# Patient Record
Sex: FEMALE | NOT HISPANIC OR LATINO
[De-identification: names, ages, dates, MRNs, and addresses within clinical notes are randomized per-mention and may not be internally consistent; named-entity substitution may affect disease eponyms.]

---

## 2017-02-27 ENCOUNTER — APPOINTMENT (OUTPATIENT)
Dept: ULTRASOUND IMAGING | Facility: CLINIC | Age: 82
End: 2017-02-27

## 2017-02-27 ENCOUNTER — APPOINTMENT (OUTPATIENT)
Dept: MAMMOGRAPHY | Facility: CLINIC | Age: 82
End: 2017-02-27

## 2017-06-20 ENCOUNTER — EMERGENCY (EMERGENCY)
Facility: HOSPITAL | Age: 82
LOS: 1 days | Discharge: HOME | End: 2017-06-20
Attending: EMERGENCY MEDICINE | Admitting: EMERGENCY MEDICINE
Payer: MEDICARE

## 2017-06-20 VITALS
DIASTOLIC BLOOD PRESSURE: 85 MMHG | RESPIRATION RATE: 18 BRPM | WEIGHT: 108.03 LBS | SYSTOLIC BLOOD PRESSURE: 156 MMHG | OXYGEN SATURATION: 99 % | HEIGHT: 61 IN | TEMPERATURE: 98 F | HEART RATE: 84 BPM

## 2017-06-20 DIAGNOSIS — I10 ESSENTIAL (PRIMARY) HYPERTENSION: ICD-10-CM

## 2017-06-20 DIAGNOSIS — Z88.0 ALLERGY STATUS TO PENICILLIN: ICD-10-CM

## 2017-06-20 DIAGNOSIS — E03.9 HYPOTHYROIDISM, UNSPECIFIED: ICD-10-CM

## 2017-06-20 DIAGNOSIS — Z86.73 PERSONAL HISTORY OF TRANSIENT ISCHEMIC ATTACK (TIA), AND CEREBRAL INFARCTION WITHOUT RESIDUAL DEFICITS: ICD-10-CM

## 2017-06-20 DIAGNOSIS — R60.0 LOCALIZED EDEMA: ICD-10-CM

## 2017-06-20 DIAGNOSIS — M19.90 UNSPECIFIED OSTEOARTHRITIS, UNSPECIFIED SITE: ICD-10-CM

## 2017-06-20 PROCEDURE — 93971 EXTREMITY STUDY: CPT | Mod: 26,LT

## 2017-06-20 PROCEDURE — 99284 EMERGENCY DEPT VISIT MOD MDM: CPT | Mod: 25

## 2017-06-20 PROCEDURE — 93971 EXTREMITY STUDY: CPT

## 2017-06-20 PROCEDURE — 99284 EMERGENCY DEPT VISIT MOD MDM: CPT

## 2017-06-20 RX ORDER — METOPROLOL TARTRATE 50 MG
0 TABLET ORAL
Qty: 0 | Refills: 0 | COMMUNITY

## 2017-06-20 RX ORDER — AMLODIPINE BESYLATE 2.5 MG/1
1 TABLET ORAL
Qty: 0 | Refills: 0 | COMMUNITY

## 2017-06-20 RX ORDER — LEVOTHYROXINE SODIUM 125 MCG
0 TABLET ORAL
Qty: 0 | Refills: 0 | COMMUNITY

## 2017-06-20 NOTE — ED ADULT NURSE NOTE - OBJECTIVE STATEMENT
Pt is A&OX4. Pt was received C/O left ankle swelling since today and left calf pain at times. However, pt presently denies pain and discomfort. Pt was evaluated by Dr. Mace who is aware. No s/s of distress noted. Continue to monitor pt.

## 2017-06-20 NOTE — ED ADULT TRIAGE NOTE - CHIEF COMPLAINT QUOTE
"my right leg has been swollen all day today."  patient denies injury to affected extremity.  patient denies shortness of breath, chest pain, dizziness.   steady gait noted

## 2017-06-21 NOTE — ED PROVIDER NOTE - OBJECTIVE STATEMENT
Pt is a 82 yo female who presents to the ED with a cc of left lower ext swelling.  Pt reports that she first noted the swelling this afternoon.  Denies pain in the leg, denies numbness or tingling in ext.  Pt denies trauma to the leg.   She has not been on any recent long car rides or trips.  Pt has no known history of DVTs or PEs.  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  Denies CP, SOB, abd pain.  Daughter was concerned that this may be a blood clot.

## 2017-06-21 NOTE — ED PROVIDER NOTE - PLAN OF CARE
Return to the ED for any new or worsening symptoms  Take your medication as prescribed  Elevate your legs when seated  Consider compression stockings  Follow up with your PMD in 2-3 days for a recheck

## 2017-06-21 NOTE — ED PROVIDER NOTE - MUSCULOSKELETAL, MLM
+2 pitting edema noted to left lower ext, no overlying cellulitis, no TTP, sensation intact +pedal pulse

## 2023-02-02 DIAGNOSIS — I10 ESSENTIAL (PRIMARY) HYPERTENSION: ICD-10-CM

## 2023-02-02 DIAGNOSIS — H54.8 LEGAL BLINDNESS, AS DEFINED IN USA: ICD-10-CM

## 2023-02-02 DIAGNOSIS — Z91.81 HISTORY OF FALLING: ICD-10-CM

## 2023-02-02 DIAGNOSIS — H40.9 UNSPECIFIED GLAUCOMA: ICD-10-CM

## 2023-02-02 DIAGNOSIS — R94.6 ABNORMAL RESULTS OF THYROID FUNCTION STUDIES: ICD-10-CM

## 2023-02-02 DIAGNOSIS — E03.9 HYPOTHYROIDISM, UNSPECIFIED: ICD-10-CM

## 2023-02-02 DIAGNOSIS — Z92.89 PERSONAL HISTORY OF OTHER MEDICAL TREATMENT: ICD-10-CM

## 2023-02-02 DIAGNOSIS — Z86.69 PERSONAL HISTORY OF OTHER DISEASES OF THE NERVOUS SYSTEM AND SENSE ORGANS: ICD-10-CM

## 2023-02-02 DIAGNOSIS — L85.3 XEROSIS CUTIS: ICD-10-CM

## 2023-02-07 PROBLEM — E03.9 HYPOTHYROIDISM, UNSPECIFIED: Chronic | Status: ACTIVE | Noted: 2017-06-20

## 2023-02-07 PROBLEM — I10 ESSENTIAL (PRIMARY) HYPERTENSION: Chronic | Status: ACTIVE | Noted: 2017-06-20

## 2023-02-07 PROBLEM — I63.9 CEREBRAL INFARCTION, UNSPECIFIED: Chronic | Status: ACTIVE | Noted: 2017-06-20

## 2023-02-07 PROBLEM — H40.9 UNSPECIFIED GLAUCOMA: Chronic | Status: ACTIVE | Noted: 2017-06-20

## 2023-02-21 ENCOUNTER — OUTPATIENT (OUTPATIENT)
Dept: OUTPATIENT SERVICES | Facility: HOSPITAL | Age: 88
LOS: 1 days | Discharge: HOME | End: 2023-02-21

## 2023-02-21 DIAGNOSIS — R79.9 ABNORMAL FINDING OF BLOOD CHEMISTRY, UNSPECIFIED: ICD-10-CM

## 2023-02-22 ENCOUNTER — RESULT REVIEW (OUTPATIENT)
Age: 88
End: 2023-02-22

## 2023-02-22 ENCOUNTER — APPOINTMENT (OUTPATIENT)
Dept: HEMATOLOGY ONCOLOGY | Facility: CLINIC | Age: 88
End: 2023-02-22
Payer: MEDICARE

## 2023-02-22 VITALS
DIASTOLIC BLOOD PRESSURE: 76 MMHG | BODY MASS INDEX: 16.64 KG/M2 | RESPIRATION RATE: 17 BRPM | HEART RATE: 83 BPM | OXYGEN SATURATION: 96 % | TEMPERATURE: 98 F | WEIGHT: 91 LBS | SYSTOLIC BLOOD PRESSURE: 129 MMHG

## 2023-02-22 DIAGNOSIS — D75.839 THROMBOCYTOSIS, UNSPECIFIED: ICD-10-CM

## 2023-02-22 DIAGNOSIS — N18.31 CHRONIC KIDNEY DISEASE, STAGE 3A: ICD-10-CM

## 2023-02-22 DIAGNOSIS — D64.9 ANEMIA, UNSPECIFIED: ICD-10-CM

## 2023-02-22 DIAGNOSIS — D50.9 IRON DEFICIENCY ANEMIA, UNSPECIFIED: ICD-10-CM

## 2023-02-22 LAB
BASOPHILS # BLD AUTO: 0.05 K/UL — SIGNIFICANT CHANGE UP (ref 0–0.2)
BASOPHILS NFR BLD AUTO: 0.5 % — SIGNIFICANT CHANGE UP (ref 0–2)
EOSINOPHIL # BLD AUTO: 0.16 K/UL — SIGNIFICANT CHANGE UP (ref 0–0.5)
EOSINOPHIL NFR BLD AUTO: 1.6 % — SIGNIFICANT CHANGE UP (ref 0–6)
ERYTHROCYTE [SEDIMENTATION RATE] IN BLOOD: 85 MM/HR — HIGH (ref 0–20)
FERRITIN SERPL-MCNC: 28 NG/ML — SIGNIFICANT CHANGE UP (ref 15–150)
FOLATE SERPL-MCNC: >20 NG/ML — SIGNIFICANT CHANGE UP
HCT VFR BLD CALC: 36.6 % — SIGNIFICANT CHANGE UP (ref 34.5–45)
HGB BLD-MCNC: 11.7 G/DL — SIGNIFICANT CHANGE UP (ref 11.5–15.5)
IMM GRANULOCYTES NFR BLD AUTO: 0.3 % — SIGNIFICANT CHANGE UP (ref 0–0.9)
IRON SATN MFR SERPL: 12 % — LOW (ref 14–50)
IRON SATN MFR SERPL: 42 UG/DL — SIGNIFICANT CHANGE UP (ref 30–160)
LYMPHOCYTES # BLD AUTO: 1.6 K/UL — SIGNIFICANT CHANGE UP (ref 1–3.3)
LYMPHOCYTES # BLD AUTO: 16.2 % — SIGNIFICANT CHANGE UP (ref 13–44)
MCHC RBC-ENTMCNC: 29 PG — SIGNIFICANT CHANGE UP (ref 27–34)
MCHC RBC-ENTMCNC: 32 GM/DL — SIGNIFICANT CHANGE UP (ref 32–36)
MCV RBC AUTO: 90.8 FL — SIGNIFICANT CHANGE UP (ref 80–100)
MONOCYTES # BLD AUTO: 1.1 K/UL — HIGH (ref 0–0.9)
MONOCYTES NFR BLD AUTO: 11.1 % — SIGNIFICANT CHANGE UP (ref 2–14)
NEUTROPHILS # BLD AUTO: 6.95 K/UL — SIGNIFICANT CHANGE UP (ref 1.8–7.4)
NEUTROPHILS NFR BLD AUTO: 70.3 % — SIGNIFICANT CHANGE UP (ref 43–77)
NRBC # BLD: 0 /100 WBCS — SIGNIFICANT CHANGE UP (ref 0–0)
PLATELET # BLD AUTO: 502 K/UL — HIGH (ref 150–400)
RBC # BLD: 4.03 M/UL — SIGNIFICANT CHANGE UP (ref 3.8–5.2)
RBC # FLD: 14.6 % — HIGH (ref 10.3–14.5)
TIBC SERPL-MCNC: 359 UG/DL — SIGNIFICANT CHANGE UP (ref 220–430)
UIBC SERPL-MCNC: 318 UG/DL — SIGNIFICANT CHANGE UP (ref 110–370)
VIT B12 SERPL-MCNC: 1145 PG/ML — SIGNIFICANT CHANGE UP (ref 232–1245)
WBC # BLD: 9.89 K/UL — SIGNIFICANT CHANGE UP (ref 3.8–10.5)
WBC # FLD AUTO: 9.89 K/UL — SIGNIFICANT CHANGE UP (ref 3.8–10.5)

## 2023-02-22 PROCEDURE — 99204 OFFICE O/P NEW MOD 45 MIN: CPT

## 2023-02-22 RX ORDER — MULTIVITAMIN
CAPSULE ORAL
Refills: 0 | Status: ACTIVE | COMMUNITY

## 2023-02-22 RX ORDER — SELENIUM 50 MCG
TABLET ORAL
Refills: 0 | Status: ACTIVE | COMMUNITY

## 2023-02-22 RX ORDER — BERBERINE CHLOR/SEAWEED/CHROM 500-250 MG
CAPSULE ORAL
Refills: 0 | Status: ACTIVE | COMMUNITY

## 2023-02-22 RX ORDER — VITAMIN B COMPLEX
CAPSULE ORAL
Refills: 0 | Status: ACTIVE | COMMUNITY

## 2023-02-22 RX ORDER — AMLODIPINE BESYLATE 10 MG/1
10 TABLET ORAL
Refills: 0 | Status: ACTIVE | COMMUNITY

## 2023-02-22 RX ORDER — UBIDECARENONE 200 MG
CAPSULE ORAL
Refills: 0 | Status: ACTIVE | COMMUNITY

## 2023-02-22 RX ORDER — MAGNESIUM OXIDE/MAG AA CHELATE 300 MG
CAPSULE ORAL
Refills: 0 | Status: ACTIVE | COMMUNITY

## 2023-02-22 RX ORDER — LUTEIN 6 MG
TABLET ORAL
Refills: 0 | Status: ACTIVE | COMMUNITY

## 2023-02-22 RX ORDER — BENZOCAINE/BENZALKONIUM/ALOE/E 5 %-0.13 %
CREAM (GRAM) TOPICAL
Refills: 0 | Status: ACTIVE | COMMUNITY

## 2023-02-22 NOTE — ASSESSMENT
[FreeTextEntry1] : Patient is an 87 y.o. with a chronic thrombocytosis and mild anemia - low iron studies. \par Reviewed with patient that elevated platelets can be from a primary or a secondary bone marrow issues. \par Doubt primary disorder since she has normal white and red cell counts, and since she has had for so long would suspect would have risen higher by now. \par More likely thrombocytosis is reactive.  She does have borderline low iron studies and more recently a mild anemia.  No obvious source for iron deficiency - but no baseline labs - may have been chronic. \par \par Plan:\par Propose replace iron stores 1st and see if this normalizes platelet count.  If not can then consider a more extensive evaluation. \par Explained she can take PO or IV iron. Advantages of IV iron - works fast but small risk of serious allergic reaction.  PO iron works slower but can cause GI distress. \par Patient initially preferred IV iron, but then was afraid of the risk for anaphylaxis. \par She opted to try PO iron 1st.  Explained works best if taken on an empty stomach with Vitamin C.  If cannot tolerate this way then OK to take with food.  Informed may turn stool black and can be constipating - may need to add stool softener. Can take any over the counter brand - recommended Hemaplex.\par For completeness sake will also check B12, Folate levels. \par Will repeat labs in 3 months to make sure absorbing.  Will have done thru home draw. \par All questions answered. \par \par Dr. Neetu Lau ([place])\par Mavis Molina (Daughter - [phone] - POA)\par

## 2023-02-22 NOTE — REASON FOR VISIT
[Initial Consultation] : an initial consultation for [Other: _____] : [unfilled] [FreeTextEntry2] : thrombocytosis

## 2023-02-22 NOTE — REVIEW OF SYSTEMS
[Patient Intake Form Reviewed] : Patient intake form was reviewed [Fatigue] : fatigue [Vision Problems] : vision problems [Muscle Weakness] : muscle weakness [Difficulty Walking] : difficulty walking [Negative] : Heme/Lymph [FreeTextEntry2] : Denies pains [FreeTextEntry3] : blind

## 2023-02-22 NOTE — RESULTS/DATA
[FreeTextEntry1] :  WBC: 9.89,   Hgb: 11.7,  Hct: 36.6,  MCV: 90.8,  Plts: 502\par Ferritin, TSAT, ESR, B12, Folate: pending

## 2023-02-22 NOTE — HISTORY OF PRESENT ILLNESS
[de-identified] : KAYLEY BEDOYA is an 87 y.o. 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 \par \par 5/2/22:       WBC: 9.7,   Hgb: 12.1,  Hct: 36.3,  MCV: 88,  Plts: 552\par 11/11/22 :  WBC: 10.6,  Hgb: 11.0,  Hct: 33.8,  MCV: 86,  Plts: 604,  Ferritin: 45,  TSAT: 12%\par \par Recalls had heavy periods when younger and anemia as well.  Has had 2 children. Never took PO iron. \par Has never had a blood transfusion. ?May have had IV iron after her last pregnancy?\par No personal or family hx of a bleeding or clotting disorder (although patient has never really had any surgery). \par Recalls platelets have always been high.  Daughter has been keeping track - highest perhaps was 655K. \par Patient states reluctant to take PO iron due to hx of hemorrhagic stroke?  Was afraid that somehow this was related. \par Overall she is reluctant to see doctors - feels they make her sick.  Has never had a GI eval. \par \par Denies pica. Patient in wheelchair.  No overt symptoms of anemia.  \par Denies any obvious bleeding - nose, gums, vagina, hematuria, BRBPR. No significant bruising. \par \par \par

## 2023-02-22 NOTE — PHYSICAL EXAM
[Thin] : thin [Normal] : affect appropriate [de-identified] : frail elderly woman in wheelchair [de-identified] : blind [de-identified] : no c/c/e [de-identified] : no rashes

## 2023-02-23 ENCOUNTER — NON-APPOINTMENT (OUTPATIENT)
Age: 88
End: 2023-02-23

## 2023-04-05 ENCOUNTER — NON-APPOINTMENT (OUTPATIENT)
Age: 88
End: 2023-04-05

## 2023-04-13 ENCOUNTER — APPOINTMENT (OUTPATIENT)
Dept: INFUSION THERAPY | Facility: CLINIC | Age: 88
End: 2023-04-13

## 2023-04-13 VITALS
HEART RATE: 92 BPM | SYSTOLIC BLOOD PRESSURE: 116 MMHG | WEIGHT: 99.06 LBS | OXYGEN SATURATION: 96 % | BODY MASS INDEX: 21.37 KG/M2 | TEMPERATURE: 98.7 F | DIASTOLIC BLOOD PRESSURE: 75 MMHG | RESPIRATION RATE: 18 BRPM | HEIGHT: 57.09 IN

## 2023-04-21 ENCOUNTER — APPOINTMENT (OUTPATIENT)
Dept: INFUSION THERAPY | Facility: CLINIC | Age: 88
End: 2023-04-21

## 2023-04-21 VITALS
SYSTOLIC BLOOD PRESSURE: 111 MMHG | DIASTOLIC BLOOD PRESSURE: 73 MMHG | WEIGHT: 100.25 LBS | RESPIRATION RATE: 18 BRPM | TEMPERATURE: 97.3 F | BODY MASS INDEX: 21.63 KG/M2 | OXYGEN SATURATION: 97 % | HEART RATE: 86 BPM | HEIGHT: 57 IN

## 2023-04-24 DIAGNOSIS — R11.2 NAUSEA WITH VOMITING, UNSPECIFIED: ICD-10-CM

## 2023-05-22 ENCOUNTER — LABORATORY RESULT (OUTPATIENT)
Age: 88
End: 2023-05-22

## 2023-05-23 ENCOUNTER — LABORATORY RESULT (OUTPATIENT)
Age: 88
End: 2023-05-23

## 2023-05-25 ENCOUNTER — LABORATORY RESULT (OUTPATIENT)
Age: 88
End: 2023-05-25

## 2023-05-26 ENCOUNTER — NON-APPOINTMENT (OUTPATIENT)
Age: 88
End: 2023-05-26

## 2023-07-15 DIAGNOSIS — R05.9 COUGH, UNSPECIFIED: ICD-10-CM

## 2023-07-15 DIAGNOSIS — R50.9 FEVER, UNSPECIFIED: ICD-10-CM

## 2023-07-15 RX ORDER — DOXYCYCLINE HYCLATE 100 MG/1
100 CAPSULE ORAL DAILY
Qty: 14 | Refills: 0 | Status: ACTIVE | COMMUNITY
Start: 2023-07-15 | End: 1900-01-01

## 2023-07-22 DIAGNOSIS — R05.3 CHRONIC COUGH: ICD-10-CM

## 2023-08-07 ENCOUNTER — LABORATORY RESULT (OUTPATIENT)
Age: 88
End: 2023-08-07

## 2023-09-18 ENCOUNTER — LABORATORY RESULT (OUTPATIENT)
Age: 88
End: 2023-09-18

## 2023-09-19 ENCOUNTER — NON-APPOINTMENT (OUTPATIENT)
Age: 88
End: 2023-09-19

## 2023-11-07 ENCOUNTER — OUTPATIENT (OUTPATIENT)
Dept: OUTPATIENT SERVICES | Facility: HOSPITAL | Age: 88
LOS: 1 days | Discharge: HOME | End: 2023-11-07

## 2023-11-07 DIAGNOSIS — D50.9 IRON DEFICIENCY ANEMIA, UNSPECIFIED: ICD-10-CM

## 2023-11-10 ENCOUNTER — APPOINTMENT (OUTPATIENT)
Dept: HEMATOLOGY ONCOLOGY | Facility: CLINIC | Age: 88
End: 2023-11-10

## 2023-11-20 ENCOUNTER — APPOINTMENT (OUTPATIENT)
Dept: HEMATOLOGY ONCOLOGY | Facility: CLINIC | Age: 88
End: 2023-11-20

## 2024-01-15 LAB
CHOLEST SERPL-MCNC: 170 MG/DL
ESTIMATED AVERAGE GLUCOSE: 120 MG/DL
HBA1C MFR BLD HPLC: 5.8 %
HDLC SERPL-MCNC: 57 MG/DL
LDLC SERPL CALC-MCNC: 100 MG/DL
NONHDLC SERPL-MCNC: 113 MG/DL
TRIGL SERPL-MCNC: 68 MG/DL

## 2024-01-23 ENCOUNTER — EMERGENCY (EMERGENCY)
Facility: HOSPITAL | Age: 89
LOS: 1 days | Discharge: HOME | End: 2024-01-23
Payer: MEDICARE

## 2024-01-23 VITALS
TEMPERATURE: 98 F | SYSTOLIC BLOOD PRESSURE: 119 MMHG | HEART RATE: 85 BPM | DIASTOLIC BLOOD PRESSURE: 67 MMHG | OXYGEN SATURATION: 97 % | RESPIRATION RATE: 18 BRPM

## 2024-01-23 VITALS
OXYGEN SATURATION: 98 % | TEMPERATURE: 98 F | WEIGHT: 97 LBS | SYSTOLIC BLOOD PRESSURE: 128 MMHG | DIASTOLIC BLOOD PRESSURE: 67 MMHG | HEIGHT: 61 IN | HEART RATE: 80 BPM | RESPIRATION RATE: 19 BRPM

## 2024-01-23 PROCEDURE — 73030 X-RAY EXAM OF SHOULDER: CPT | Mod: 26,LT

## 2024-01-23 PROCEDURE — 73090 X-RAY EXAM OF FOREARM: CPT | Mod: 26,LT

## 2024-01-23 PROCEDURE — 73110 X-RAY EXAM OF WRIST: CPT

## 2024-01-23 PROCEDURE — 73060 X-RAY EXAM OF HUMERUS: CPT | Mod: 26,LT

## 2024-01-23 PROCEDURE — 99284 EMERGENCY DEPT VISIT MOD MDM: CPT

## 2024-01-23 PROCEDURE — 73090 X-RAY EXAM OF FOREARM: CPT

## 2024-01-23 PROCEDURE — 73030 X-RAY EXAM OF SHOULDER: CPT

## 2024-01-23 PROCEDURE — 72125 CT NECK SPINE W/O DYE: CPT | Mod: MA

## 2024-01-23 PROCEDURE — 72125 CT NECK SPINE W/O DYE: CPT | Mod: 26,MA

## 2024-01-23 PROCEDURE — 73060 X-RAY EXAM OF HUMERUS: CPT

## 2024-01-23 PROCEDURE — 70450 CT HEAD/BRAIN W/O DYE: CPT | Mod: 26,MA

## 2024-01-23 PROCEDURE — 73110 X-RAY EXAM OF WRIST: CPT | Mod: 26,LT

## 2024-01-23 PROCEDURE — 70450 CT HEAD/BRAIN W/O DYE: CPT | Mod: MA

## 2024-01-23 PROCEDURE — 99284 EMERGENCY DEPT VISIT MOD MDM: CPT | Mod: 25

## 2024-01-23 RX ORDER — ACETAMINOPHEN 500 MG
650 TABLET ORAL ONCE
Refills: 0 | Status: COMPLETED | OUTPATIENT
Start: 2024-01-23 | End: 2024-01-23

## 2024-01-23 RX ADMIN — Medication 650 MILLIGRAM(S): at 22:03

## 2024-01-23 NOTE — ED PROVIDER NOTE - ATTENDING APP SHARED VISIT CONTRIBUTION OF CARE
Emergency Medicine Attending MD Sykes:  patient seen and evaluated with the PA.  I was present for key portions of the History and Physical, and I agree with the Impression and Plan.      Patient is a 78-year-old female brought in by daughter for evaluation 88-year-old female brought in by daughter for evaluation status post mechanical fall at 6 PM.  Patient was attempting to sit down but the chair moved out from behind her causing her to fall over onto her left shoulder patient states that she protected her head but daughter is unsure.    Both daughter and patient are adamant that this was a mechanical fall.  Currently denies chest pain, shortness of breath, abdominal pain, back pain.  Patient is legally blind in both eyes on account of cataracts.    VS: wnl  Gen: elderly female in NAD, thin body habitus 1 episode of emesis after the fall.  Head: NC/AT.     Eyes: Not applicable secondary to blindness  Neck: trachea midline, supple.  Resp:  No distress, CTA B.  No chest wall tenderness to palpation cardiac RRR, no RMG.    Abdomen:  soft, nondistended, nontender; no R/G.  Ext: no deformities, no edema.  But patient is tender in the left proximal shoulder and left wrist.  Neuro:  A&Ox4 appears non focal.  Moving all 4 extremities equally  Skin:  thin skin, otherwise arm and dry as visualized, no rash.     Psych:  Normal affect and mood.    Medical decision making:   Cannot rule out ICH or C-spine injury based on [place] head rule or [place] CT cervical spine rule.  Shoulder tenderness on exam and age raises risk for osteopenia and therefore high risk for fracture.  Will image left humerus, left shoulder, left wrist, left forearm.       if the above imaging is unremarkable we would feel safe discharging the patient to follow-up with PMD as needed as an outpatient.  Strict return precautions

## 2024-01-23 NOTE — ED PROVIDER NOTE - CLINICAL SUMMARY MEDICAL DECISION MAKING FREE TEXT BOX
Medical decision making:   Cannot rule out ICH or C-spine injury based on [place] CT head rule or [place] CT cervical spine rule.  Shoulder tenderness on exam and age raises risk for osteopenia and therefore high risk for fracture.  Will image left humerus, left shoulder, left wrist, left forearm.       if the above imaging is unremarkable we would feel safe discharging the patient to follow-up with PMD as needed as an outpatient.  Strict return precautions

## 2024-01-23 NOTE — ED PROVIDER NOTE - NSFOLLOWUPINSTRUCTIONS_ED_ALL_ED_FT
Please make sure to follow up with your primary care doctor within 1-2 days. Bring a copy of all of your results with you to your follow up appointments.   Return to the ER as discussed if you develop any new or worsening symptoms.     You may take Tylenol 500mg every 6 hours as needed for pain.    Follow up with the primary care doctor in regards to incidental finding of "Nodular pleural parenchymal thickening in the left lung apex with additional 1 cm nodule in the left upper lobe."

## 2024-01-23 NOTE — ED PROVIDER NOTE - ED STEMI HIDDEN
Mother provided phone number for units at this time. Patient sleeping comfortably in his room   hide

## 2024-01-23 NOTE — ED PROVIDER NOTE - PHYSICAL EXAMINATION
CONSTITUTIONAL: In no apparent distress.  ENT: Airway patent, moist mucous membranes.   EYES: EOMI. Conjunctiva normal appearing. Keeps eyes closed.   NECK: No midline c spine TTP.   CARDIAC: Normal rate, regular rhythm.  Heart sounds S1, S2.    RESPIRATORY: Breath sounds clear and equal bilaterally.   GASTROINTESTINAL: Abdomen soft, non-tender, not distended.  MUSCULOSKELETAL: Spine appears normal. No midline TTP. No e/o L shoulder dislocation. No TTP. Mild mid shaft left humeral TTP with some soft tissue swelling. No L forearm or wrist TTP. No obvious deformities. Sensation and radial pulses intact. No hip TTP bilaterally. Moving all extremities.   NEUROLOGICAL: Alert and oriented x3, grossly normal neuro exam.

## 2024-01-23 NOTE — ED PROVIDER NOTE - WR ORDER STATUS 2
----- Message from Nehemiah Huffman MD sent at 7/21/2017  5:46 PM CDT -----  I wanted to get back to you with your colonoscopy results. You had 4 colon polyps removed which were benign.   I would advise a repeat colonoscopy in 5 years to make sure no new p Resulted

## 2024-01-23 NOTE — ED PROVIDER NOTE - PATIENT PORTAL LINK FT
You can access the FollowMyHealth Patient Portal offered by [facility] by registering at the following website: [email or web address]. By joining [facility]’s FollowMyHealth portal, you will also be able to view your health information using other applications (apps) compatible with our system.

## 2024-01-23 NOTE — ED PROVIDER NOTE - OBJECTIVE STATEMENT
87 yo F with a PMH of blindness presents sp mechanical fall around 6PM. States she was moving a chair in her room when she turned the wrong way and fell on to her left shoulder. States she fell on to her shoulder to "protect her head." Denies head trauma or LOC. Has been c/o L should pain and L upper arm pain since incident. Family reports pt did vomit x 1 after fall but has not again since. Pt denies nausea or abd pain. Denies fever, chills, chest pain, neck or back pain, headache, dizziness, changes in speech, paresthesias. No AC or ASA. Per daughter pt at baseline mental status.

## 2024-01-23 NOTE — ED PROVIDER NOTE - PROGRESS NOTE DETAILS
Daughter informed of incidental lung thickening and nodule and need for outpt CT chest. Copy of results given. Feels safe to take pt home and would like her to be discharged. Pt w/o complaints. Tylenol for pain control PRN. Patient stable for discharge.  Follow up instructions given, return to ED precautions reviewed. Importance of follow up emphasized, patient and daughter verbalized understanding.  All questions answered. Becca Moreau PA-C

## 2024-01-24 ENCOUNTER — APPOINTMENT (OUTPATIENT)
Dept: ORTHOPEDIC SURGERY | Facility: CLINIC | Age: 89
End: 2024-01-24
Payer: MEDICARE

## 2024-01-24 ENCOUNTER — APPOINTMENT (OUTPATIENT)
Dept: ORTHOPEDIC SURGERY | Facility: CLINIC | Age: 89
End: 2024-01-24

## 2024-01-24 VITALS — BODY MASS INDEX: 18.31 KG/M2 | WEIGHT: 97 LBS | HEIGHT: 61 IN

## 2024-01-24 DIAGNOSIS — Z86.39 PERSONAL HISTORY OF OTHER ENDOCRINE, NUTRITIONAL AND METABOLIC DISEASE: ICD-10-CM

## 2024-01-24 DIAGNOSIS — I10 ESSENTIAL (PRIMARY) HYPERTENSION: ICD-10-CM

## 2024-01-24 PROCEDURE — 99204 OFFICE O/P NEW MOD 45 MIN: CPT

## 2024-01-24 PROCEDURE — 73030 X-RAY EXAM OF SHOULDER: CPT | Mod: LT

## 2024-01-24 NOTE — ED POST DISCHARGE NOTE - NS ED POST DC CALL 1
Patient contacted [facility]: Mirvaso is a topical medication which can decrease superficial blood flow where applied. Side effects are uncommon and include stinging, redness and allergic reactions.

## 2024-01-24 NOTE — PHYSICAL EXAM
[Left] : left shoulder [] : no sensory deficits [FreeTextEntry9] : Range of motion was not assessed. [de-identified] : Strength was not assessed.

## 2024-01-24 NOTE — ED ADULT NURSE NOTE - NEURO MENTATION
Called Roselia from [employer]. She still needs a script and certification form which were faxed but not received. Enola Seip asked to faxed the forms again. normal

## 2024-01-24 NOTE — ED ADULT NURSE NOTE - OBJECTIVE STATEMENT
89 y/o female presents to the ED s/p witnessed mechanical fall at 18:00. A/ox4. Ambulatory without assistive devices at baseline. PMH: Blindness. Patient endorses that she was moving a chair in her room when she turned and fell onto her L shoulder. Patient denies headstrike, AC use and LOC. Patient's daughter endorses 1 episode of vomiting. Patient denies blurry vision, HA, nausea and vomiting at this time. Patient denies chest pain and dyspnea. Safety and comfort provided.

## 2024-01-24 NOTE — ED POST DISCHARGE NOTE - DETAILS
Spoke to patients daughter Mavis who was with patient in the ED and informed of results. Patient was not d/c with sling. Advised sling would be recommended management at this time as well as Ortho f/up. She was given options of returning to ED for sling placement and help with follow up vs purchasing sling for management and having rapid Ortho f/up. She advised she prefer to follow up with Orthopedics if possible. Information provided to referral coordinator Lila for follow up appointment,

## 2024-01-24 NOTE — REASON FOR VISIT
[Family Member] : family member [FreeTextEntry2] : This is an 88 year old HD F with a left proximal humerus fracture after a fall on 1/23/24.  She was seen at [facility] and d/c with a sprain.  This morning, the attending radiologist called her daughter and explained there is a fracture.  She has a sling that was purchased today OTC, but it is not on right now.  She is taking Tylenol.  No numbness.  No prior history.  She is blind.

## 2024-01-24 NOTE — HISTORY OF PRESENT ILLNESS
[9] : 9 [7] : 7 [Sharp] : sharp [Frequent] : frequent [Household chores] : household chores [Leisure] : leisure [Sleep] : sleep [Retired] : Work status: retired [de-identified] : Patient yesterday fracturing her left shoulder. [] : no [FreeTextEntry1] : Left shoulder [FreeTextEntry7] : sometimes to her hand [FreeTextEntry9] : tylenol [de-identified] : movement [de-identified] : 1/23/24 [de-identified] : [facility] [de-identified] : xrays

## 2024-01-24 NOTE — ED ADULT NURSE NOTE - NSFALLHARMRISKINTERV_ED_ALL_ED
Assistance OOB with selected safe patient handling equipment if applicable/Communicate risk of Fall with Harm to all staff, patient, and family/Monitor gait and stability/Provide patient with walking aids/Provide visual cue: red socks, yellow wristband, yellow gown, etc/Reinforce activity limits and safety measures with patient and family/Bed in lowest position, wheels locked, appropriate side rails in place/Call bell, personal items and telephone in reach/Instruct patient to call for assistance before getting out of bed/chair/stretcher/Non-slip footwear applied when patient is off stretcher/[place] to call system/Physically safe environment - no spills, clutter or unnecessary equipment/Purposeful Proactive Rounding/Room/bathroom lighting operational, light cord in reach

## 2024-01-24 NOTE — ASSESSMENT
[FreeTextEntry1] : We reviewed the findings and the history. Questions were answered and concerns addressed. The options were outlined. A super sling was fitted. Pain control outlined. She is a fall risk, and will now require more consistent nursing care at home. She cannot be alone during the recovery process. She will need help with her ADLs, including hygiene and eating.  Patient was seen by Dr. Leonel Box. Patient was seen by Hannah RAY under the supervision of Dr. Leonel Box. Progress note was completed by Hannah RAY.

## 2024-01-25 ENCOUNTER — APPOINTMENT (OUTPATIENT)
Dept: ORTHOPEDIC SURGERY | Facility: CLINIC | Age: 89
End: 2024-01-25

## 2024-02-07 ENCOUNTER — APPOINTMENT (OUTPATIENT)
Dept: ORTHOPEDIC SURGERY | Facility: CLINIC | Age: 89
End: 2024-02-07
Payer: MEDICARE

## 2024-02-07 VITALS — BODY MASS INDEX: 18.31 KG/M2 | HEIGHT: 61 IN | WEIGHT: 97 LBS

## 2024-02-07 PROCEDURE — 99214 OFFICE O/P EST MOD 30 MIN: CPT

## 2024-02-07 PROCEDURE — 73030 X-RAY EXAM OF SHOULDER: CPT | Mod: LT

## 2024-02-07 NOTE — PHYSICAL EXAM
[Left] : left shoulder [] : no sensory deficits [FreeTextEntry3] : The ecchymosis is less. [FreeTextEntry9] : Range of motion was not assessed. [de-identified] : Strength was not assessed.

## 2024-02-07 NOTE — REASON FOR VISIT
[Family Member] : family member [FreeTextEntry2] : This is an 88 year old F with a left proximal humerus fracture after a fall on 1/23/24.  She was seen at [facility] and d/c with a sprain.  This morning, the attending radiologist called her daughter and explained there is a fracture.  She has a sling that was purchased today OTC, but it is not on right now.  She is taking Tylenol.  No numbness.  No prior history.  She is blind.  She is doing OK.

## 2024-02-07 NOTE — ASSESSMENT
[FreeTextEntry1] : She will continue the sling. This was adjusted. Ice as needed. F/u 1 week for repeat x-rays. If ok, PT will be planned. Questions answered. She was able to ambulate somewhat with her arms and assistance. Now that her left arm is fractured, she can no longer ambulate independently. She is also more limited in her ADLs. She requires assistance for ambulating and ADLs.  Patient was seen by Dr. Leonel Box. Patient was seen by Hannah RAY under the supervision of Dr. Leonel Box. Progress note was completed by Hannah RAY.

## 2024-02-07 NOTE — HISTORY OF PRESENT ILLNESS
[Retired] : Work status: retired [4] : 4 [Sharp] : sharp [Frequent] : frequent [Household chores] : household chores [Leisure] : leisure [Sleep] : sleep [de-identified] : Patient yesterday fracturing her left shoulder. [] : no [FreeTextEntry1] : Left shoulder [FreeTextEntry7] : bicep [FreeTextEntry9] : tylenol [de-identified] : movement [de-identified] : 1/23/24 [de-identified] : [facility] [de-identified] : xrays

## 2024-02-19 ENCOUNTER — APPOINTMENT (OUTPATIENT)
Dept: ORTHOPEDIC SURGERY | Facility: CLINIC | Age: 89
End: 2024-02-19
Payer: MEDICARE

## 2024-02-19 VITALS — WEIGHT: 97 LBS | HEIGHT: 61 IN | BODY MASS INDEX: 18.31 KG/M2

## 2024-02-19 PROCEDURE — 73030 X-RAY EXAM OF SHOULDER: CPT | Mod: LT

## 2024-02-19 PROCEDURE — 99214 OFFICE O/P EST MOD 30 MIN: CPT

## 2024-02-19 NOTE — REASON FOR VISIT
[Family Member] : family member [FreeTextEntry2] : This is an 88 year old F with a left proximal humerus fracture after a fall on 1/23/24.  She was seen at [facility] and d/c with a sprain, but radiology review confirmed the fracture.  No numbness.  No prior history.  She is blind.  She is doing OK.

## 2024-02-19 NOTE — PHYSICAL EXAM
[Left] : left shoulder [] : no sensory deficits [de-identified] : Strength was not assessed. [TWNoteComboBox4] : passive forward flexion 90 degrees [de-identified] : external rotation 40 degrees

## 2024-02-19 NOTE — ASSESSMENT
[FreeTextEntry1] : We reviewed the healing. Sling use outlined. Home PT is not as ideal. Therapy will begin.  Patient was seen by Dr. Leonel Box. Patient was seen by Hannah RAY under the supervision of Dr. Leonel Box. Progress note was completed by Hannah RAY.

## 2024-02-21 PROBLEM — Z00.00 ENCOUNTER FOR PREVENTIVE HEALTH EXAMINATION: Status: ACTIVE | Noted: 2024-02-21

## 2024-03-20 ENCOUNTER — APPOINTMENT (OUTPATIENT)
Dept: ORTHOPEDIC SURGERY | Facility: CLINIC | Age: 89
End: 2024-03-20
Payer: MEDICARE

## 2024-03-20 VITALS — HEIGHT: 61 IN | BODY MASS INDEX: 18.69 KG/M2 | WEIGHT: 99 LBS

## 2024-03-20 PROCEDURE — 73030 X-RAY EXAM OF SHOULDER: CPT | Mod: LT

## 2024-03-20 PROCEDURE — 99214 OFFICE O/P EST MOD 30 MIN: CPT

## 2024-03-20 NOTE — REASON FOR VISIT
[Family Member] : family member [FreeTextEntry2] : This is an 88 year old F with a left proximal humerus fracture after a fall on 1/23/24.  She was seen at [facility] and d/c with a sprain, but radiology review confirmed the fracture.  No numbness.  No prior history.  She is blind.  She is doing OK. She has home PT and is doing well. Her sleep is not affected. She no longer takes Tylenol. She is still in the sling.

## 2024-03-20 NOTE — ASSESSMENT
[FreeTextEntry1] : We discussed her course.  She may discontinue with the sling.  PT will continue.  They will call for PT renewal.  They will follow up in 3 months.  Their questions were answered.   Patient was seen by Dr. Leonel Box. Entered by Angeles Rehman acting as scribe.
No

## 2024-03-20 NOTE — PHYSICAL EXAM
[Left] : left shoulder [Sitting] : sitting [] : no sensory deficits [de-identified] : Strength was not assessed. [TWNoteComboBox4] : passive forward flexion 105 degrees [de-identified] : external rotation 45 degrees

## 2024-03-20 NOTE — HISTORY OF PRESENT ILLNESS
[2] : 2 [0] : 0 [de-identified] : Here for left shoulder follow up for proximal humerus fracture. [] : no

## 2024-04-29 NOTE — ED POST DISCHARGE NOTE - OTHER COMMUNICATION
who advised she will assist in follow up. Emphasized importance of immobilization with sling until Ortho f/up is established. Patient daughter provided admin number for any issues. All questions were addressed and patients daughter was appreciative of call. - Latha Giraldo PA-C
The patient is a 36y Female complaining of back pain general.

## 2024-06-19 ENCOUNTER — APPOINTMENT (OUTPATIENT)
Dept: ORTHOPEDIC SURGERY | Facility: CLINIC | Age: 89
End: 2024-06-19
Payer: MEDICARE

## 2024-06-19 VITALS — WEIGHT: 99 LBS | HEIGHT: 61 IN | BODY MASS INDEX: 18.69 KG/M2

## 2024-06-19 DIAGNOSIS — S42.292A OTHER DISPLACED FRACTURE OF UPPER END OF LEFT HUMERUS, INITIAL ENCOUNTER FOR CLOSED FRACTURE: ICD-10-CM

## 2024-06-19 PROCEDURE — 99214 OFFICE O/P EST MOD 30 MIN: CPT

## 2024-06-19 NOTE — REASON FOR VISIT
[Family Member] : family member [FreeTextEntry2] : This is an 88 year old F with a left proximal humerus fracture after a fall on 1/23/24.  She was seen at [facility] and d/c with a sprain, but radiology review confirmed the fracture.  No numbness.  No prior history.  She is blind.  She finished her home PT but would like to continue. She is doing well. She has not had to take any medication for pain.

## 2024-06-19 NOTE — PHYSICAL EXAM
[Left] : left shoulder [Sitting] : sitting [] : no sensory deficits [de-identified] : Strength was not assessed. [TWNoteComboBox4] : passive forward flexion 110 degrees [de-identified] : external rotation 45 degrees

## 2024-06-19 NOTE — HISTORY OF PRESENT ILLNESS
[3] : 3 [0] : 0 [Dull/Aching] : dull/aching [Occasional] : occasional [Sleep] : sleep [Retired] : Work status: retired [de-identified] : Patient is here for a follow up on her left proximal humerus fracture.  [de-identified] : Completed physical therapy 2 x a week. Doing home exercises now. [FreeTextEntry1] : Left shoulder

## 2024-06-19 NOTE — ASSESSMENT
[FreeTextEntry1] : We discussed her course.  PT will continue.  With this motion loss, she still has not restored an acceptable level of ADLs and function.  They will call for follow up.  Their questions were answered.   Patient was seen by Dr. Leonel Box. Entered by Angeles Rehman acting as scribe.

## 2024-06-30 NOTE — ED ADULT NURSE NOTE - NS ED NURSE LEVEL OF CONSCIOUSNESS ORIENTATION
Interventional Guidewire removed without incident room air Oriented - self; Oriented - place; Oriented - time

## 2024-07-08 DIAGNOSIS — R30.0 DYSURIA: ICD-10-CM

## 2024-07-08 DIAGNOSIS — U07.1 COVID-19: ICD-10-CM

## 2024-07-08 RX ORDER — NIRMATRELVIR AND RITONAVIR 300-100 MG
20 X 150 MG & KIT ORAL
Qty: 1 | Refills: 0 | Status: ACTIVE | COMMUNITY
Start: 2024-07-08 | End: 1900-01-01

## 2024-07-09 LAB
APPEARANCE: CLEAR
BACTERIA: NEGATIVE /HPF
BILIRUBIN URINE: NEGATIVE
BLOOD URINE: NEGATIVE
CAST: 0 /LPF
COLOR: YELLOW
EPITHELIAL CELLS: 3 /HPF
GLUCOSE QUALITATIVE U: NEGATIVE MG/DL
KETONES URINE: 15 MG/DL
LEUKOCYTE ESTERASE URINE: ABNORMAL
MICROSCOPIC-UA: NORMAL
NITRITE URINE: NEGATIVE
PROTEIN URINE: NEGATIVE MG/DL
SPECIFIC GRAVITY URINE: 1.01
UROBILINOGEN URINE: 0.2 MG/DL

## 2024-07-12 LAB — BACTERIA UR CULT: ABNORMAL

## 2024-07-29 DIAGNOSIS — R93.89 ABNORMAL FINDINGS ON DIAGNOSTIC IMAGING OF OTHER SPECIFIED BODY STRUCTURES: ICD-10-CM

## 2024-10-10 DIAGNOSIS — R05.9 COUGH, UNSPECIFIED: ICD-10-CM

## 2024-10-10 RX ORDER — AZITHROMYCIN 250 MG/1
250 TABLET, FILM COATED ORAL
Qty: 1 | Refills: 0 | Status: ACTIVE | COMMUNITY
Start: 2024-10-10 | End: 1900-01-01

## 2024-10-11 DIAGNOSIS — E53.8 DEFICIENCY OF OTHER SPECIFIED B GROUP VITAMINS: ICD-10-CM

## 2024-10-14 ENCOUNTER — LABORATORY RESULT (OUTPATIENT)
Age: 89
End: 2024-10-14

## 2024-10-15 ENCOUNTER — LABORATORY RESULT (OUTPATIENT)
Age: 89
End: 2024-10-15

## 2024-12-19 NOTE — ED PROVIDER NOTE - CARDIOVASCULAR [+], MLM
CONSULTATION NOTE - CARDIOLOGY    Chief Complaint   Patient presents with   Preoperative cardiovascular evaluation  Coronary artery disease  Hyperlipidemia   Diabetes mellitus    Referring physician: Elicia Sidhu MD    HISTORY OF PRESENT ILLNESS  Ms. Cason is a 59 year old female with:    Coronary artery disease s/p CABG in 2019  Hyperlipidemia   Diabetes Mellitus  Hypothyroidism     On her last visit, the patient complained of having leg cramps and she was advised to discontinue atorvastatin to see if they get better.  Within a few days leg cramps and leg pains dissipated.    She needs to be on lipid-lowering therapy because of strong risk factors and prior bypass surgery.  She was advised to try rosuvastatin 40 mg a day.  If she redevelops leg discomfort, she will have to be switched to a PCSK9 inhibitor.    MEDICATIONS:  Current Outpatient Medications   Medication Sig Dispense Refill    rosuvastatin (CRESTOR) 40 MG tablet Take 1 tablet by mouth daily. 30 tablet 1    Semaglutide, 2 MG/DOSE, (Ozempic, 2 MG/DOSE,) 8 MG/3ML Solution Pen-injector Inject 2 mg into the skin every 7 days. Indications: Type 2 Diabetes 9 mL 0    cholecalciferol (VITAMIN D) 1.25 mg(50,000 units) capsule Take 1 capsule by mouth 1 day a week. 12 capsule 0    metoPROLOL succinate (TOPROL-XL) 50 MG 24 hr tablet Take 1 tablet by mouth daily. 90 tablet 3    levothyroxine 112 MCG tablet TAKE 1 TABLET BY MOUTH DAILY 30 tablet 5    omeprazole (PriLOSEC) 40 MG capsule Take 1 capsule by mouth every morning. 90 capsule 3    tiZANidine (ZANAFLEX) 4 MG tablet Take 1 tablet by mouth at bedtime. (Patient taking differently: Take 4 mg by mouth at bedtime as needed.) 30 tablet 1    folic acid (FOLATE) 1 MG tablet TAKE 1 TABLET BY MOUTH DAILY 90 tablet 3    aspirin 81 MG tablet Take 1 tablet by mouth daily. (Patient taking differently: Take 81 mg by mouth every morning.)       No current facility-administered medications for this visit.      ALLERGIES:  ALLERGIES:   Allergen Reactions    Sulfa Antibiotics HIVES        Past Medical History:  Past Medical History:   Diagnosis Date    Anemia associated with acute blood loss 2019    Chest pain     abnormal stress echo 19: ST depression inferolateral leads + CP, inferior septum and apical hypokinesis at stress EF 55%    Colon polyps     Coronary artery disease     Dry eye syndrome     Former smoker     1 ppd/25 years    GERD (gastroesophageal reflux disease)     History of transfusion     Hx of CABG 2019    Hypothyroidism     Idiopathic hypotension 2019    Palpitations 2019    Type 2 diabetes mellitus (CMD)     Urinary tract infection     resolved     Past surgical history:  Past Surgical History:   Procedure Laterality Date    Cabg, arterial, three  2019    Carpal tunnel release Bilateral     Cholecystectomy      Colonoscopy      Esophagogastroduodenoscopy (egd)      Hernia repair Right     inguinal with mesh    Hysterectomy      Incontinence surgery  2023    Removal of ovary(s)      Sleeve gastroplasty      Total abdominal hysterectomy         Family History:  Family History   Problem Relation Age of Onset    Heart Mother         CAD, MI at 50, CABG    Diabetes Mother     Heart Father         CAD, MI in 60s, Has ICD    Diabetes Father     Other Brother         complications of myasthenias gravis (44)    Patient is unaware of any medical problems Brother     Other Child         Autism, degeorge syndrome    Cancer, Colon Other      Social History:  Social History     Tobacco Use    Smoking status: Former     Current packs/day: 0.00     Types: Cigarettes     Quit date: 2011     Years since quittin.9    Smokeless tobacco: Never   Vaping Use    Vaping status: never used   Substance Use Topics    Alcohol use: Yes     Comment: social    Drug use: Never      OBJECTIVE   Physical Exam   Visit Vitals  /82 (BP Location: LUE - Left upper extremity)   Pulse 84    Temp 98.1 °F (36.7 °C)   Ht 5' 5\" (1.651 m)   Wt 95.3 kg (210 lb)   LMP  (LMP Unknown)   BMI 34.95 kg/m²     Constitutional: She appears healthy.  HENT: Normocephalic, atraumatic  Eyes: Conjunctivae are normal.   Neck: Thyroid normal. No JVD present. No neck adenopathy.   Pulmonary/Chest: Breath sounds normal. No rales. No tenderness.   Cardiac: Normal first and second heart sounds. No murmurs. No rubs or gallops.   Peripheral vessels: Normal upper extremity pulses. Femoral and popliteal pulses palpable. No femoral bruits.   Abdominal: Soft. No distension and no mass. There is no splenomegaly or hepatomegaly. There is no tenderness.   Musculoskeletal: Normal range of motion.   Neurological:  Alert and oriented to person, place, and time. Intact cranial nerves.   Skin: Skin is warm.  Few reticular veins.  No varicose veins    CARDIAC TESTING/IMAGING  I have reviewed the pertinent imaging study reports. These are the pertinent findings:    CHEMISTRY  Creatinine (mg/dL)   Date Value   11/19/2024 0.75   05/08/2024 0.71     Potassium (mmol/L)   Date Value   11/19/2024 4.5   05/08/2024 4.5     HGB (g/dL)   Date Value   05/08/2024 12.9   01/26/2024 12.6     TSH (mcUnits/mL)   Date Value   05/08/2024 0.484   11/09/2023 0.557     Hemoglobin A1C (%)   Date Value   11/19/2024 7.3 (H)      LIPID PANEL  Cholesterol (mg/dL)   Date Value   05/08/2024 118     LDL (mg/dL)   Date Value   05/08/2024 49     HDL (mg/dL)   Date Value   05/08/2024 52   11/09/2023 48 (L)     Triglycerides (mg/dL)   Date Value   05/08/2024 87        PROCEDURES    Electrocardiogram done today 10/31 is normal.      Procedures reviewed this visit    TTE 2/3/22: \"Left ventricle: The cavity size is normal. Wall thickness is normal. The ejection fraction is 71%.\"    Stress test 2/3/22: \"Normal nuclear perfusion study. Normal regional wall thickening is noted on gated SPECT analysis. Normal ejection fraction.\"    Holter Monitor (07/26/2021): \"Patient reported  symptoms of heart fluttering/ pounding/ racing that occurred during NSR.   Mobitz 1 noted during sleeping hours.\"    [facility] Myocardial Perfusion Imaging - 09/24/2019: Exercised for 5 minutes and 13 seconds on a Matthew protocol achieving 6.8 METS. \"Myocardial perfusion imaging is within normal limits. Left ventricular wall motion and ejection fraction are normal. Electrocardiographic portion of the exercise stress test is negative for ischemia.There was episode of 5 beat run of nonsustained ventricular tachycardia during the study\"     Left Heart Cath  (2/21/2019): Normal systolic left ventricular function with mildly elevated left ventricular end-diastolic pressure.Diffuse coronary artery disease including calcification, diffuse plaquing. Three-vessel severe coronary artery disease involving the proximal LAD, the obtuse marginal branch, and the mid RCA.    ASSESSMENT/PLAN    Statin induced myopathy   She was advised to try another statin before switching to a PCSK9 inhibitor if still intolerant.  If she tolerates rosuvastatin, she should have a lipid profile in 1 month if not wait until after she is on a PCSK9 inhibitor and do lipids a month later.    Stable ischemic heart disease  Stable 5 years after bypass surgery.  Preventive aspects of coronary artery disease were discussed. These include blood pressure goal of less than 130/80, LDL cholesterol of less than 70, 30 minutes of moderate intensity exercise every day, maintenance of ideal body weight, and optimal management of diabetes.  If she has progressive symptoms of chest discomfort with exertion, she should be in touch    Hyperlipidemia LDL goal <70  Refer to comments above.  Her baseline LDL cholesterol is very high.  She has had a good response to statins but then has muscular aches and pains    Type 2 diabetes mellitus (CMS/HCC)  Remains on a combination of metformin and semaglutide.  She has not been able to lose any weight.    Hypothyroidism  Maintained on  100 mcg levothyroxine. Followed by PCP.    Mild chronic venous insufficiency  Varicose veins are absent but she does have reticular veins.  Unlikely to the cause of her symptoms.         Summary of your Discharge Medications            Accurate as of December 19, 2024  4:38 PM. Always use your most recent med list.                Take these Medications        Details   aspirin 81 MG tablet   Take 1 tablet by mouth daily.     cholecalciferol 1.25 mg(50,000 units) capsule  Commonly known as: VITAMIN D   Take 1 capsule by mouth 1 day a week.  Comment: 1.25 mg = 50,000 units     folic acid 1 MG tablet  Commonly known as: FOLATE   TAKE 1 TABLET BY MOUTH DAILY     levothyroxine 112 MCG tablet   TAKE 1 TABLET BY MOUTH DAILY  Comment: ZERO refills remain on this prescription. Your patient is requesting advance approval of refills for this medication to PREVENT ANY MISSED DOSES     metoPROLOL succinate 50 MG 24 hr tablet  Commonly known as: TOPROL-XL   Take 1 tablet by mouth daily.     omeprazole 40 MG capsule  Commonly known as: PriLOSEC   Take 1 capsule by mouth every morning.     Ozempic (2 MG/DOSE) 8 MG/3ML Solution Pen-injector   Generic drug: Semaglutide (2 MG/DOSE)  Inject 2 mg into the skin every 7 days. Indications: Type 2 Diabetes     rosuvastatin 40 MG tablet  Commonly known as: CRESTOR   Take 1 tablet by mouth daily.     tiZANidine 4 MG tablet  Commonly known as: ZANAFLEX   Take 1 tablet by mouth at bedtime.            Medications Discontinued During This Encounter   Medication Reason    cetirizine (ZyrTEC) 10 MG tablet Therapy Completed    atorvastatin (LIPITOR) 80 MG tablet Side Effects    mupirocin (BACTROBAN) 2 % ointment Therapy Completed    fluticasone (FLONASE) 50 MCG/ACT nasal spray Therapy Completed    rosuvastatin (CRESTOR) 40 MG tablet Error         Yuly was seen today for follow-up.    Diagnoses and all orders for this visit:    Ischemic heart disease    Hyperlipidemia LDL goal <70  -     Lipid  Panel With Reflex; Future    Type 2 diabetes mellitus without complication, without long-term current use of insulin  (CMD)    Other orders  -     Discontinue: rosuvastatin (CRESTOR) 40 MG tablet; Take 1 tablet by mouth daily.  -     rosuvastatin (CRESTOR) 40 MG tablet; Take 1 tablet by mouth daily.          Plan of care discussed with the patient.  All questions were answered.  Patient verbalized understanding and agrees with the plan. Primary care issues are being followed by the primary care physician. A letter has been sent to the referring physician.    Return in about 3 months (around 3/19/2025).    I have reviewed and revised the note above. The documentation recorded by the scribe accurately reflects history obtained, actions preformed and decisions made by me.     Calin Fong MD  [facility]  [facility]   PERIPHERAL EDEMA

## 2024-12-28 RX ORDER — DOXYCYCLINE HYCLATE 100 MG/1
100 CAPSULE ORAL
Qty: 20 | Refills: 0 | Status: ACTIVE | COMMUNITY
Start: 2024-12-28 | End: 1900-01-01

## 2025-03-29 ENCOUNTER — LABORATORY RESULT (OUTPATIENT)
Age: 89
End: 2025-03-29

## 2025-03-30 LAB
APPEARANCE: ABNORMAL
BILIRUBIN URINE: NEGATIVE
BLOOD URINE: NEGATIVE
COLOR: NORMAL
GLUCOSE QUALITATIVE U: NEGATIVE MG/DL
KETONES URINE: ABNORMAL MG/DL
LEUKOCYTE ESTERASE URINE: ABNORMAL
NITRITE URINE: NEGATIVE
PH URINE: 5.5
PROTEIN URINE: NORMAL MG/DL
SPECIFIC GRAVITY URINE: 1.03
UROBILINOGEN URINE: 0.2 MG/DL

## 2025-04-16 ENCOUNTER — LABORATORY RESULT (OUTPATIENT)
Age: 89
End: 2025-04-16

## 2025-07-31 DIAGNOSIS — J47.9 BRONCHIECTASIS, UNCOMPLICATED: ICD-10-CM

## 2025-07-31 RX ORDER — DOXYCYCLINE HYCLATE 100 MG/1
100 CAPSULE ORAL
Qty: 20 | Refills: 0 | Status: ACTIVE | COMMUNITY
Start: 2025-07-31 | End: 1900-01-01

## 2025-09-05 DIAGNOSIS — L30.9 DERMATITIS, UNSPECIFIED: ICD-10-CM

## 2025-09-05 RX ORDER — CLOBETASOL PROPIONATE CREAM USP, 0.05% 0.5 MG/G
0.05 CREAM TOPICAL
Qty: 1 | Refills: 0 | Status: ACTIVE | COMMUNITY
Start: 2025-09-05 | End: 1900-01-01